# Patient Record
(demographics unavailable — no encounter records)

---

## 2024-10-24 NOTE — PHYSICAL EXAM
[de-identified] : Right shoulder exam  Inspection: No malalignment, No defects, No atrophy Skin: No masses, No lesions Neck: Negative Spurling's, full ROM, no pain with ROM AROM: FF to 180, abduction to 90, ER to 80, IR to upper lumbar Painful arc ROM: none Tenderness: + pain over the trapezius, no bicipital tenderness, no tenderness to the greater tuberosity/RTC insertion, no anterior shoulder/lesser tuberosity tenderness Strength: 5/5 ER, 5/5 IR in adduction, 5/5 supraspinatus testing, + mild [place]'s test AC Joint: + ttp/pain with cross arm testing Biceps: Speed Negative, Yergusons Negative Impingement test: + mild Mejia, + Neer  Stability: Stable Vasc: 2+ radial pulse Neuro: AIN, PIN, Ulnar nerve intact to motor Sensation: Intact to light touch throughout  [de-identified] : The following radiographs were ordered and read by me during this patients visit. I reviewed each radiograph in detail with the patient and discussed the findings as highlighted below.   3 views of the right shoulder were obtained, 10/24/2024, that show no acute fracture or dislocation. There is no glenohumeral and no AC joint degenerative change seen. Type II acromion. There is no significant malalignment. No significant other obvious osseous abnormality, otherwise unremarkable.

## 2024-10-24 NOTE — ADDENDUM
[FreeTextEntry1] : This note was written by Eloise Crane on 10/24/2024 acting solely as a scribe for Dr. Chidi Blanco.   All medical record entries made by the Scribe were at my, Dr. Chidi Blanco, direction and personally dictated by me on 10/24/2024. I have personally reviewed the chart and agree that the record accurately reflects my personal performance of the history, physical exam, assessment and plan.

## 2024-10-24 NOTE — DISCUSSION/SUMMARY
[de-identified] : 42 y/o male with chronic right shoulder pain.   A discussion was had with the patient regarding potential sources of inflammatory shoulder discomfort, including rotator cuff tendon dysfunction (including tendonitis vs. internal structural damage), subdeltoid/subacromial bursitis, or impingement of the rotator cuff at the acromion. Other contributing sources of pain may be the acromioclavicular joint or long head of the biceps tendon. He also clinically has pain over the trapezius consistent with muscular spasm. Irritation is typically caused either by overhead repetitive activity or as a result of minor injury.   Discussed short-term and long-term outcomes as well as the goal of treatment to reduce pain and restore function. Nonsurgical treatment is typically first-line therapy that may take weeks to months to resolve symptoms; includes rest from overhead activities, NSAIDs, home exercise program versus physical therapy to restore normal strength/ROM/function of the shoulder, and possible corticosteroid injection. Also discussed the role of arthroscopic surgical intervention when nonsurgical treatment does not adequately relieve pain/inflammation.   Recommendations: Conservative modalities as above (overhead activity rest/activity avoidance until less symptomatic, ice, NSAIDs if tolerated, home exercise strengthening and stretching program).   Physical therapy Rx given for shoulder/RTC strengthening and conditioning program   Follow-up: 6-8wks as needed if symptoms persist or worsen

## 2024-10-24 NOTE — HISTORY OF PRESENT ILLNESS
[de-identified] : 43-year-old right-handed male presents with a 10-year history of right shoulder pain, which is constant and exacerbated by lying on the arm and with forward flexion. He denies any recent injury. However, he notes possible subluxation/instability events but no kathrin dislocation. The patient reports intermittent tingling in the right arm but is not currently taking any pain medication. He works as an , which may involve repetitive upper extremity movements.  The patient's past medical history, past surgical history, medications and allergies were reviewed by me today with the patient and documented accordingly. In addition, the patient's family and social history, which were noncontributory to this visit were reviewed also.

## 2025-01-13 NOTE — ASSESSMENT
[FreeTextEntry1] : 43-year-old male with history of food impaction, likely EOE though 2018 biopsies were not consistent with eosinophilic esophagitis with only 3 eosinophils per high-power field in the midesophagus.  I recommend that we undergo index endoscopy at this time.  I did discuss with patient and his spouse that patient will need to undergo endoscopies every 3 months until we achieve remission.  We did briefly discuss treatment for eosinophilic esophagitis, including PPI twice daily, budesonide topical, dupilumab or 6 food elimination diet.  1.  EGD with biopsies The risks, benefits and alternatives of the procedure were discussed with the patient in detail. Risks include bleeding, infection, bowel perforation, adverse reaction to sedation and missed lesions. All questions were answered. The patient expressed understanding of these risks and is agreeable to proceed.

## 2025-01-13 NOTE — HISTORY OF PRESENT ILLNESS
[FreeTextEntry1] : 43-year-old male with diagnosis of EOE 8 years ago, also with an episode of food impaction on Memorial Day 2016 here for initial consultation and establishment of GI care.  Patient states that in 2016, he had an upper endoscopy and was diagnosed with "mild" eosinophilic esophagitis.  He does not recall undergoing treatment but does remember being on Prilosec for a little while without any change of his symptoms.  He now has progressive dysphagia to both solids and liquids.  He also had a modified barium swallow in 2022 when he saw Dr. Christian Ojeda.  At 1 point he was prescribed Flovent but stopped after 30 days.  Biopsies from June 2016 demonstrate active esophagitis with intraepithelial eosinophil infiltration.  Midesophagus demonstrated greater than 20 per high-power field eosinophils.  In 2018, patient had GE junction biopsies 13 eosinophils per high-power field, midesophagus demonstrated 3 eosinophils per high-power field with marked eosinophilic degranulation.

## 2025-02-06 NOTE — ASSESSMENT
[FreeTextEntry1] : 43-year-old male status post EGD with confirmed diagnosis of eosinophilic esophagitis, with microscopic changes consistent with EOE, here for discussion of treatment options.  We did discuss budesonide, dupilumab, 6 food elimination diet, and patient would like to start with dupilumab weekly injections.  He will need a repeat upper endoscopy in 12 weeks to confirm histologic remission.  1. Dupilumab 300mg SQ Qwkly 2. Rescope in 12 weeks

## 2025-02-06 NOTE — HISTORY OF PRESENT ILLNESS
[FreeTextEntry1] : 43 year old male with EOE s/p EGD 1/24/25 with distal bx 5-12 eos/hpf, >25 eos/hpf here for follow up care He is here for discussion of test results